# Patient Record
Sex: FEMALE | Race: WHITE | NOT HISPANIC OR LATINO | Employment: FULL TIME | URBAN - METROPOLITAN AREA
[De-identification: names, ages, dates, MRNs, and addresses within clinical notes are randomized per-mention and may not be internally consistent; named-entity substitution may affect disease eponyms.]

---

## 2024-03-28 PROBLEM — Z04.9 CONDITION NOT FOUND: Status: ACTIVE | Noted: 2024-03-28

## 2024-03-28 PROBLEM — I87.2 VENOUS INSUFFICIENCY: Status: ACTIVE | Noted: 2024-03-28

## 2024-04-03 PROBLEM — M79.89 SWELLING OF LIMB: Status: ACTIVE | Noted: 2024-04-03

## 2024-04-03 PROBLEM — I83.892 VARICOSE VEINS OF LEFT LOWER EXTREMITY WITH COMPLICATIONS: Status: ACTIVE | Noted: 2024-04-03

## 2024-04-03 PROBLEM — M79.604 RIGHT LEG PAIN: Status: ACTIVE | Noted: 2024-04-03

## 2024-04-03 NOTE — PROGRESS NOTES
"Chief Complaint  Post-op Follow-up (9wk left lower extremity venous. Pt was unable to get scan in vascular lab, needs to find in network facility to get scan. )    Subjective        HPI: Greta Cesar is a 68 y.o. female who presents for evaluation of residual or recurrent varicose veins of the right lower extremity.  Previously underwent EVLA left GSV July, 2022, with more recent Varithena sclerotherapy of multiple varicose veins left lower extremity at the mid thigh and proximal and anterior calf 1/24/2024.  She had more than the usual discomfort following injection treatments, particularly in the anterolateral proximal calf.  Now her symptoms have improved to the point that she is no longer having pain, though the treated area still remains somewhat tender.  She is very pleased with her results however, and indicates that with her activities of daily living, her leg feels normally.    Objective   Vital Signs:  /80   Ht 166.4 cm (65.5\")   Wt 72.6 kg (160 lb)   BMI 26.22 kg/m²     Exam: There are incompressible varicosities in the anteromedial proximal left calf, with mild tenderness.  No erythema or induration.  No other varicosities are evident.  The calves are soft and nontender and there is no swelling.    Result Review :      Data: the patient declined lower extremity venous imaging due to cost.        Greta Cesar  has no history on file for tobacco use..           Assessment and Plan     Diagnoses and all orders for this visit:    1. Varicose veins of left lower extremity with complications (Primary)    2. Right leg pain    3. Swelling of limb      Very nice 68-year-old woman with recurrent varicose veins of the left lower extremity with pain, who is doing well now about 2 months out from Varithena sclerotherapy of tributary vein varicosities.  After a period of worsening symptoms related to postinjection inflammation, she is now doing well, and says spontaneously that her treated left leg feels " normal.  She still has incompressible varicosities, but I believe these are going to contract, become.  There is nothing about her history or exam that mandates duplex scan so I do not believe that is an issue.  Activity ad antonio.  Use compression as desired.  Return again on request.  Imperceptible    Follow Up     Return if symptoms worsen or fail to improve.  Patient was given instructions and counseling regarding her condition or for health maintenance advice. Please see specific information pulled into the AVS if appropriate.

## 2024-04-04 ENCOUNTER — OFFICE VISIT (OUTPATIENT)
Age: 69
End: 2024-04-04
Payer: COMMERCIAL

## 2024-04-04 ENCOUNTER — HOSPITAL ENCOUNTER (OUTPATIENT)
Facility: HOSPITAL | Age: 69
Discharge: HOME | End: 2024-04-04
Payer: COMMERCIAL

## 2024-04-04 VITALS
DIASTOLIC BLOOD PRESSURE: 80 MMHG | HEIGHT: 66 IN | WEIGHT: 160 LBS | BODY MASS INDEX: 25.71 KG/M2 | SYSTOLIC BLOOD PRESSURE: 122 MMHG

## 2024-04-04 DIAGNOSIS — I83.892 VARICOSE VEINS OF LEFT LOWER EXTREMITY WITH COMPLICATIONS: Primary | ICD-10-CM

## 2024-04-04 DIAGNOSIS — M79.604 RIGHT LEG PAIN: ICD-10-CM

## 2024-04-04 DIAGNOSIS — M79.89 SWELLING OF LIMB: ICD-10-CM

## 2024-04-04 RX ORDER — FLUTICASONE PROPIONATE 50 MCG
1 SPRAY, SUSPENSION (ML) NASAL DAILY
COMMUNITY
Start: 2024-03-29